# Patient Record
Sex: FEMALE | Race: WHITE | NOT HISPANIC OR LATINO | Employment: OTHER | URBAN - METROPOLITAN AREA
[De-identification: names, ages, dates, MRNs, and addresses within clinical notes are randomized per-mention and may not be internally consistent; named-entity substitution may affect disease eponyms.]

---

## 2022-10-26 ENCOUNTER — CONSULT (OUTPATIENT)
Dept: GASTROENTEROLOGY | Facility: CLINIC | Age: 72
End: 2022-10-26

## 2022-10-26 VITALS
DIASTOLIC BLOOD PRESSURE: 66 MMHG | HEIGHT: 58 IN | BODY MASS INDEX: 24.27 KG/M2 | TEMPERATURE: 98.1 F | SYSTOLIC BLOOD PRESSURE: 120 MMHG | WEIGHT: 115.6 LBS

## 2022-10-26 DIAGNOSIS — K21.9 GASTROESOPHAGEAL REFLUX DISEASE, UNSPECIFIED WHETHER ESOPHAGITIS PRESENT: Primary | ICD-10-CM

## 2022-10-26 DIAGNOSIS — K58.9 IRRITABLE BOWEL SYNDROME, UNSPECIFIED TYPE: ICD-10-CM

## 2022-10-26 DIAGNOSIS — R14.0 ABDOMINAL BLOATING: ICD-10-CM

## 2022-10-26 RX ORDER — ALUMINUM ZIRCONIUM OCTACHLOROHYDREX GLY 16 G/100G
GEL TOPICAL
COMMUNITY

## 2022-10-26 RX ORDER — LISINOPRIL AND HYDROCHLOROTHIAZIDE 12.5; 1 MG/1; MG/1
TABLET ORAL
COMMUNITY
Start: 2022-08-01

## 2022-10-26 RX ORDER — MONTELUKAST SODIUM 10 MG/1
TABLET ORAL
COMMUNITY
Start: 2022-08-01

## 2022-10-26 RX ORDER — ATORVASTATIN CALCIUM 40 MG/1
TABLET, FILM COATED ORAL
COMMUNITY
Start: 2022-10-18

## 2022-10-26 RX ORDER — DEXLANSOPRAZOLE 60 MG/1
CAPSULE, DELAYED RELEASE ORAL
COMMUNITY
Start: 2022-10-18

## 2022-10-26 RX ORDER — ESTRADIOL 10 UG/1
INSERT VAGINAL
COMMUNITY
Start: 2022-10-21

## 2022-10-26 RX ORDER — SERTRALINE HYDROCHLORIDE 100 MG/1
TABLET, FILM COATED ORAL
COMMUNITY
Start: 2022-10-18

## 2022-10-28 PROBLEM — R14.0 ABDOMINAL BLOATING: Status: ACTIVE | Noted: 2022-10-28

## 2022-10-28 PROBLEM — K58.9 IBS (IRRITABLE BOWEL SYNDROME): Status: ACTIVE | Noted: 2022-10-28

## 2022-10-28 PROBLEM — K21.9 GERD (GASTROESOPHAGEAL REFLUX DISEASE): Status: ACTIVE | Noted: 2022-10-28

## 2022-10-28 NOTE — ASSESSMENT & PLAN NOTE
Reported long-standing history of IBS  Symptoms appear to be relatively stable with exception of abdominal bloating which has worsened recently  Etiologies include SIBO vs dysbiosis vs other    · Trial low FODMAP diet  · Advised to try OTC Beano 3 times daily with meals  · Consider empiric treatment with rifaximin in the future if symptoms persist  · Follow-up in 4 weeks

## 2022-10-28 NOTE — PROGRESS NOTES
[facility] - Outpatient Consultation  Prakash Dai 66 y o  female MRN: [id number]  Encounter: [id number]      ASSESSMENT & PLAN:      Problem List Items Addressed This Visit        Digestive    GERD (gastroesophageal reflux disease) - Primary     Symptoms appear to be well-controlled on Dexilant 60 mg daily and also Pepcid 20 mg daily at bedtime  Reports having had EGD in the past at [facility] but report is not available for review  No alarm symptoms  · Continue Dexilant 60 mg daily  · Continue Pepcid 20 mg daily at bedtime  · I asked patient to obtain endoscopy records for review  · If symptoms persist and unable to obtain previous endoscopy records, can discuss possible repeat endoscopy         Relevant Medications    Dexilant 60 MG capsule    IBS (irritable bowel syndrome)     Reported long-standing history of IBS  Symptoms appear to be relatively stable with exception of abdominal bloating which has worsened recently  Etiologies include SIBO vs dysbiosis vs other  · Trial low FODMAP diet  · Advised to try OTC Beano 3 times daily with meals  · Consider empiric treatment with rifaximin in the future if symptoms persist  · Follow-up in 4 weeks            Other    Abdominal bloating     Reports significant abdominal bloating which is intermittent but chronic issue  Possible etiologies include food intolerance vs SIBO vs related to underlying IBS vs other  Has tried Gas-X with no relief  · Reviewed low FODMAP diet and advised patient to follow diet strictly for 3 weeks; if symptoms improve, reintroduce food items one at a time to identify triggers  · Advised patient to try OTC Beano which may be helpful; instructed to take 3 times daily with meals  · Follow-up in 4 weeks             No orders of the defined types were placed in this encounter        Follow-up: 4 weeks  ______________________________________________________________________    HPI:  65-year-old female presents to GI clinic with complaints of worsening abdominal bloating  Patient has history of GERD and IBS as well as other medical diagnoses of HTN, HLD, anxiety  Patient reports that over the last couple of weeks, has noticed more abdominal bloating and gassiness  She has tried OTC Gas-X with no significant relief  She does take Dexilant 60 mg daily as well as Pepcid 20 mg daily QHS for history of GERD  She reports having had EGD as well as colonoscopy in the past at [facility] ([facility])  These were several years ago and report is not available for review  She denies any significant alarm symptoms including dysphagia, odynophagia, nausea, vomiting, unintentional weight loss, change in bowel habits, hematochezia, or melena  She does admit to dietary indiscretion, eating certain foods which may be contributing to her symptoms  She reports her GERD symptoms are relatively well controlled on the Dexilant and Pepcid but does admit with the increased bloating that she has had some worsening reflux as well  She also states that some of her symptoms may be related to anxiety      Last endoscopy: Remote at [facility]SH  Last colonoscopy: Remote at [address]:    CONSTITUTIONAL: Denies any fever, chills, rigors, and weight loss  HEENT: No earache or tinnitus, denies hearing loss or visual disturbances  CARDIOVASCULAR: No chest pain or palpitations  RESPIRATORY: Denies any cough, hemoptysis, shortness of breath or dyspnea on exertion  GASTROINTESTINAL: As noted in the History of Present Illness  GENITOURINARY: No problems with urination, denies any hematuria or dysuria  NEUROLOGIC: No dizziness or vertigo, denies headaches   MUSCULOSKELETAL: Denies any muscle or joint pain   SKIN: Denies skin rashes or itching  ENDOCRINE: Denies excessive thirst, denies intolerance to heat or cold  PSYCHOSOCIAL: Denies depression, denies any recent memory loss     Historical Information   Past Medical History:   Diagnosis Date   • Colon polyp    • GERD (gastroesophageal reflux disease)    • Hyperlipidemia      Past Surgical History:   Procedure Laterality Date   • COLONOSCOPY     • HYSTERECTOMY       Social History   Social History     Substance and Sexual Activity   Alcohol Use Yes     Social History     Substance and Sexual Activity   Drug Use Never     Social History     Tobacco Use   Smoking Status Former Smoker   Smokeless Tobacco Not on file     Family History   Problem Relation Age of Onset   • Hyperlipidemia Mother        MEDICATIONS & ALLERGIES:    Current Outpatient Medications   Medication Instructions   • atorvastatin (LIPITOR) 40 mg tablet No dose, route, or frequency recorded  • bifidobacterium infantis (ALIGN) capsule Oral   • Dexilant 60 MG capsule No dose, route, or frequency recorded  • estradiol (VAGIFEM, YUVAFEM) 10 MCG TABS vaginal tablet No dose, route, or frequency recorded  • lisinopril-hydrochlorothiazide (PRINZIDE,ZESTORETIC) 10-12 5 MG per tablet No dose, route, or frequency recorded  • montelukast (SINGULAIR) 10 mg tablet No dose, route, or frequency recorded  • sertraline (ZOLOFT) 100 mg tablet No dose, route, or frequency recorded  No Known Allergies    PHYSICAL EXAM:      Objective   Blood pressure 120/66, temperature 98 1 °F (36 7 °C), temperature source Tympanic, height 4' 10" (1 473 m), weight 52 4 kg (115 lb 9 6 oz)  Body mass index is 24 16 kg/m²  General Appearance:   Alert, cooperative, no distress   HEENT:   Normocephalic, atraumatic, anicteric     Neck:   Supple, symmetrical, trachea midline   Lungs:   Equal chest rise, respirations unlabored    Heart:   Regular rate and rhythm   Abdomen:   Soft, non-tender, non-distended; normal bowel sounds; no masses, no organomegaly    Rectal:   Deferred    Extremities:   No cyanosis, clubbing or edema    Neuro: Moves all 4 extremities    Skin:   No jaundice, rashes, or lesions      LAB RESULTS:     No visits with results within 1 Day(s) from this visit     Latest known visit with results is:   No results found for any previous visit  RADIOLOGY RESULTS: I have personally reviewed pertinent imaging studies  DASIA Palacios  Chief Gastroenterology Fellow  [facility]  Division of Gastroenterology and Hepatology  Available on [email or web address]

## 2022-10-28 NOTE — ASSESSMENT & PLAN NOTE
Symptoms appear to be well-controlled on Dexilant 60 mg daily and also Pepcid 20 mg daily at bedtime  Reports having had EGD in the past at [facility]SH but report is not available for review  No alarm symptoms    · Continue Dexilant 60 mg daily  · Continue Pepcid 20 mg daily at bedtime  · I asked patient to obtain endoscopy records for review  · If symptoms persist and unable to obtain previous endoscopy records, can discuss possible repeat endoscopy

## 2022-10-28 NOTE — ASSESSMENT & PLAN NOTE
Reports significant abdominal bloating which is intermittent but chronic issue  Possible etiologies include food intolerance vs SIBO vs related to underlying IBS vs other  Has tried Gas-X with no relief    · Reviewed low FODMAP diet and advised patient to follow diet strictly for 3 weeks; if symptoms improve, reintroduce food items one at a time to identify triggers  · Advised patient to try OTC Beano which may be helpful; instructed to take 3 times daily with meals  · Follow-up in 4 weeks

## 2022-11-01 ENCOUNTER — TELEPHONE (OUTPATIENT)
Dept: OTHER | Facility: OTHER | Age: 72
End: 2022-11-01

## 2022-11-01 NOTE — TELEPHONE ENCOUNTER
Patient called in with questions regarding low FODMAP diet  Through research, patient has come across conflict with ingredients for recipes  Came across [place]  What is it and can she take it? Please follow up with patient

## 2022-11-01 NOTE — TELEPHONE ENCOUNTER
Discussed with pt that fodzyme is similar to the pre and pro biotic pt already purchased  Pt purchased align   I advised continuing with that

## 2022-12-07 ENCOUNTER — OFFICE VISIT (OUTPATIENT)
Dept: GASTROENTEROLOGY | Facility: CLINIC | Age: 72
End: 2022-12-07

## 2022-12-07 VITALS
DIASTOLIC BLOOD PRESSURE: 70 MMHG | TEMPERATURE: 97.5 F | BODY MASS INDEX: 23.3 KG/M2 | SYSTOLIC BLOOD PRESSURE: 110 MMHG | HEIGHT: 58 IN | WEIGHT: 111 LBS

## 2022-12-07 DIAGNOSIS — R14.0 ABDOMINAL BLOATING: ICD-10-CM

## 2022-12-07 DIAGNOSIS — K63.89 SMALL INTESTINAL BACTERIAL OVERGROWTH (SIBO): ICD-10-CM

## 2022-12-07 DIAGNOSIS — K58.9 IRRITABLE BOWEL SYNDROME, UNSPECIFIED TYPE: Primary | ICD-10-CM

## 2022-12-07 DIAGNOSIS — K21.9 GASTROESOPHAGEAL REFLUX DISEASE, UNSPECIFIED WHETHER ESOPHAGITIS PRESENT: ICD-10-CM

## 2022-12-07 DIAGNOSIS — R10.13 EPIGASTRIC ABDOMINAL PAIN: ICD-10-CM

## 2022-12-07 NOTE — ASSESSMENT & PLAN NOTE
Symptoms appear to be controlled at this time although does have some upper abdominal discomfort  Currently on Dexilant 60 mg daily and Pepcid 20 mg daily at bedtime  No alarm symptoms  Had EGD in 2018 at [facility] which was negative    · Continue Dexilant 60 mg daily and Pepcid 20 mg daily at bedtime  · Defer endoscopy for now although if upper abdominal discomfort persists, can consider repeat endoscopic evaluation at that time  · Follow-up in 3 months

## 2022-12-07 NOTE — ASSESSMENT & PLAN NOTE
Longstanding history of IBS  Symptoms appear to have improved compared to last office visit although continues to have some abdominal bloating and upper abdominal discomfort  Stools have become more formed described as lumpy  Following low FODMAP diet although this has affected patient's appetite and she has lost a small amount of weight from this  Symptoms may be due to underlying SIBO    · Will treat IBS, possible underlying SIBO with course of rifaximin 550 mg Q8h x14 days  · Advised to hold probiotic while on rifaximin; can resume probiotic once completed therapy  · Encouraged to continue following low FODMAP diet and to reintroduce food now one at a time to help identify triggers  · Follow-up in 3 months

## 2022-12-07 NOTE — PATIENT INSTRUCTIONS
WE WILL TREAT YOU FOR POSSIBLE SIBO WITH AN ANTIBIOTIC CALLED RIFAXIMIN (XIFAXAN)  TAKE THIS EVERY 8 HOURS FOR 2 WEEKS  DURING THIS TIME, YOU CAN HOLD THE ALIGN (PROBIOTIC)  YOU CAN RESUME THE ALIGN AFTER YOU FINISH THE ANTIBIOTIC  WE WILL ALSO CHECK A RIGHT UPPER QUADRANT ULTRASOUND TO CHECK YOUR GALLBLADDER FOR GALL STONES  CONTINUE THE LOW FODMAP DIET BUT YOU MAY REINTRODUCE FOOD TO IDENTIFY TRIGGERS

## 2022-12-07 NOTE — ASSESSMENT & PLAN NOTE
Etiology unclear  May be related to reflux versus possible IBS versus SIBO versus other functional disorder versus possible biliary disease versus other  No significant alarm symptoms at this time  Last endoscopy report reviewed which was done at [facility] in 2018  No significant endoscopic findings  Biopsies negative  Had colonoscopy at that time as well which showed some oozing cecal AVMs which were treated with APC  Also sigmoid diverticulosis  No other significant findings    · Check right upper quadrant ultrasound to rule out biliary disease  · Continue Dexilant 60 mg daily and Pepcid 20 mg daily at bedtime  · Continue low FODMAP diet for IBS as noted above  · Treat for possible underlying SIBO with course of rifaximin as noted above  · Follow-up in 3 months  · If symptoms persist despite after mentioned measures, consider repeat endoscopic evaluation

## 2022-12-07 NOTE — PROGRESS NOTES
[facility] - Outpatient Follow-up  Yas Porter 66 y o  female MRN: [id number]  Encounter: [id number]      ASSESSMENT & PLAN:      Problem List Items Addressed This Visit        Digestive    GERD (gastroesophageal reflux disease)     Symptoms appear to be controlled at this time although does have some upper abdominal discomfort  Currently on Dexilant 60 mg daily and Pepcid 20 mg daily at bedtime  No alarm symptoms  Had EGD in 2018 at [facility] which was negative  · Continue Dexilant 60 mg daily and Pepcid 20 mg daily at bedtime  · Defer endoscopy for now although if upper abdominal discomfort persists, can consider repeat endoscopic evaluation at that time  · Follow-up in 3 months         IBS (irritable bowel syndrome) - Primary     Longstanding history of IBS  Symptoms appear to have improved compared to last office visit although continues to have some abdominal bloating and upper abdominal discomfort  Stools have become more formed described as lumpy  Following low FODMAP diet although this has affected patient's appetite and she has lost a small amount of weight from this  Symptoms may be due to underlying SIBO  · Will treat IBS, possible underlying SIBO with course of rifaximin 550 mg Q8h x14 days  · Advised to hold probiotic while on rifaximin; can resume probiotic once completed therapy  · Encouraged to continue following low FODMAP diet and to reintroduce food now one at a time to help identify triggers  · Follow-up in 3 months         Relevant Medications    rifaximin (XIFAXAN) 550 mg tablet       Other    Abdominal bloating     Symptoms somewhat improved although does continue to have some mild upper abdominal discomfort as well as bloating  May be due to underlying SIBO    · Trial rifaximin for possible SIBO and continue low FODMAP diet as noted above         Relevant Medications    rifaximin (XIFAXAN) 550 mg tablet    Epigastric abdominal pain     Etiology unclear  May be related to reflux versus possible IBS versus SIBO versus other functional disorder versus possible biliary disease versus other  No significant alarm symptoms at this time  Last endoscopy report reviewed which was done at [facility] in 2018  No significant endoscopic findings  Biopsies negative  Had colonoscopy at that time as well which showed some oozing cecal AVMs which were treated with APC  Also sigmoid diverticulosis  No other significant findings  · Check right upper quadrant ultrasound to rule out biliary disease  · Continue Dexilant 60 mg daily and Pepcid 20 mg daily at bedtime  · Continue low FODMAP diet for IBS as noted above  · Treat for possible underlying SIBO with course of rifaximin as noted above  · Follow-up in 3 months  · If symptoms persist despite after mentioned measures, consider repeat endoscopic evaluation         Relevant Orders    US right upper quadrant   Other Visit Diagnoses     Small intestinal bacterial overgrowth (SIBO)        Relevant Medications    rifaximin (XIFAXAN) 550 mg tablet        Orders Placed This Encounter   Procedures   • US right upper quadrant     Standing Status:   Future     Standing Expiration Date:   12/7/2026     Scheduling Instructions:      NPO Adult Preps- patients over 15years of age             If your appointment is scheduled BEFORE 12:00pm, you may not eat or drink anything other than water, after midnight on the day of you test  Medications can be taken with water  Diabetics can have juice  If your appointment is AFTER 12:00pm, you may have a fat free breakfast before 8:00am and remain on clear liquids (no soda, you may have clear tea without dairy, juice or water) until your test is complete  If your appointment is scheduled AFTER 4:00pm, you may not have anything to eat after 12 noon on the day of the test  Water and juice is allowed up to the appointment time  Do not drink any carbonated beverages  Medications may be taken as needed with water  Diabetics who need PM appointments may have a light breakfast but cannot have any additional food or drinks after breakfast  Schedule PM appointments for a time 6-8 hours after that morning meal                   NPO Pediatric Preps birth to 15 years             Infants- Birth to 3 year of age  Hold the feeding closest to the appointment time but not more than 2 hours prior  Do not bottle or breast feed or give solid foods to the baby during that 2 hour period prior to the appointment  1 year to 12 years- low fat meal (no dairy) a minimum of 4 hours prior to the appointment  No carbonated beverages are allowed  Water, Juice and clear tea are allowed  Medications may be taken with water  ---------------------------------------------------------------------------------------------------------------------             ELASTOGRAPHY PATIENST ONLY: all preps are for an 6-8 hour period- MINIMUM- NO Medications allowed during that 8 hour period, sips of water or ice chips can be allowed  Please bring your insurance cards, a form of photo ID and a list of your medications with you  Arrive 15 minutes prior to your appointment time in order to register  To schedule this appointment, please contact Central Scheduling at [phone]  Follow-up: 3 months  ______________________________________________________________________    HPI: 67-year-old female presents to GI clinic for follow-up  Patient previously seen for complaints of abdominal bloating and IBS symptoms  Since last office visit, patient has been following low FODMAP diet strictly  She reports that her overall abdominal bloating appears to have improved slightly although she continues to have some mild symptoms  She also reports having some ongoing mild epigastric discomfort described primarily as burning    She is on Dexilant 60 mg daily and Pepcid 20 mg daily at bedtime  She reports that her stools have improved in appearance  She describes the stools as more lumpy in appearance and formed compared to previous  Denies any hematochezia or melena  Denies batool diarrhea  While following the low FODMAP diet, she has had a lower appetite  She has lost a small amount of weight approximately 5 pounds over the last month due to this although more recently she has begun to reintroduce food items that she tends to enjoy  As such, she has gained 1 pound over the last week  Denies any dysphagia or odynophagia, nausea, vomiting      Last endoscopy: 2018 at [facility] which was normal; biopsies negative  Last colonoscopy: 2018 with oozing cecal AVMs treated with APC, sigmoid diverticulosis    REVIEW OF SYSTEMS:    CONSTITUTIONAL: Denies any fever, chills, rigors, and weight loss  HEENT: No earache or tinnitus, denies hearing loss or visual disturbances  CARDIOVASCULAR: No chest pain or palpitations   RESPIRATORY: Denies any cough, hemoptysis, shortness of breath or dyspnea on exertion  GASTROINTESTINAL: As noted in the History of Present Illness   GENITOURINARY: No problems with urination, denies any hematuria or dysuria  NEUROLOGIC: No dizziness or vertigo  MUSCULOSKELETAL: Denies any muscle or joint pain   SKIN: Denies skin rashes or itching   ENDOCRINE: Denies excessive thirst, denies intolerance to heat or cold  PSYCHOSOCIAL: Denies depression, denies any recent memory loss     Answers for HPI/ROS submitted by the patient on 12/6/2022  Chronicity: recurrent  Onset: more than 1 year ago  Onset quality: gradual  Frequency: intermittently  Episode duration: 5 Days  Progression since onset: gradually worsening  Pain location: epigastric region  Pain - numeric: 4/10  Pain quality: burning  Radiates to: epigastric region  anorexia: Yes  arthralgias: Yes  belching: Yes  constipation: Yes  diarrhea: No  dysuria: No  fever: No  flatus: Yes  frequency: No  headaches: Yes  hematochezia: No  hematuria: No  melena: No  myalgias: Yes  nausea: Yes  weight loss: Yes  vomiting: No  Diagnostic workup: CT scan, GI consult    Historical Information   Past Medical History:   Diagnosis Date   • Colon polyp    • GERD (gastroesophageal reflux disease)    • Hyperlipidemia      Past Surgical History:   Procedure Laterality Date   • COLONOSCOPY     • HYSTERECTOMY       Social History   Social History     Substance and Sexual Activity   Alcohol Use Yes     Social History     Substance and Sexual Activity   Drug Use Never     Social History     Tobacco Use   Smoking Status Former   Smokeless Tobacco Not on file     Family History   Problem Relation Age of Onset   • Hyperlipidemia Mother        MEDICATIONS & ALLERGIES:    Current Outpatient Medications   Medication Instructions   • atorvastatin (LIPITOR) 40 mg tablet No dose, route, or frequency recorded  • bifidobacterium infantis (ALIGN) capsule Oral   • Dexilant 60 MG capsule No dose, route, or frequency recorded  • estradiol (VAGIFEM, YUVAFEM) 10 MCG TABS vaginal tablet No dose, route, or frequency recorded  • lisinopril-hydrochlorothiazide (PRINZIDE,ZESTORETIC) 10-12 5 MG per tablet No dose, route, or frequency recorded  • montelukast (SINGULAIR) 10 mg tablet No dose, route, or frequency recorded  • rifaximin (XIFAXAN) 550 mg, Oral, Every 8 hours scheduled   • sertraline (ZOLOFT) 100 mg tablet No dose, route, or frequency recorded  No Known Allergies    PHYSICAL EXAM:      Objective   Blood pressure 110/70, temperature 97 5 °F (36 4 °C), temperature source Tympanic, height 4' 10" (1 473 m), weight 50 3 kg (111 lb)  Body mass index is 23 2 kg/m²      General Appearance:   Alert, cooperative, no distress   HEENT:   Normocephalic, atraumatic, anicteric     Neck:   Supple, symmetrical, trachea midline   Lungs:   Equal chest rise, respirations unlabored    Heart:   Regular rate and rhythm   Abdomen:   Soft, non-tender, non-distended; normal bowel sounds; no masses, no organomegaly    Rectal:   Deferred    Extremities:   No cyanosis, clubbing or edema    Neuro: Moves all 4 extremities    Skin:   No jaundice, rashes, or lesions      LAB RESULTS:     No visits with results within 1 Day(s) from this visit  Latest known visit with results is:   No results found for any previous visit  RADIOLOGY RESULTS: I have personally reviewed pertinent imaging studies  DASIA Jhaveri  Chief Gastroenterology Fellow  [address]  Division of Gastroenterology and Hepatology  Available on [email or web address]

## 2022-12-07 NOTE — ASSESSMENT & PLAN NOTE
Symptoms somewhat improved although does continue to have some mild upper abdominal discomfort as well as bloating  May be due to underlying SIBO    · Trial rifaximin for possible SIBO and continue low FODMAP diet as noted above

## 2022-12-12 ENCOUNTER — TELEPHONE (OUTPATIENT)
Dept: GASTROENTEROLOGY | Facility: CLINIC | Age: 72
End: 2022-12-12

## 2022-12-12 NOTE — TELEPHONE ENCOUNTER
----- Message from Osvaldo Torres DO sent at 12/7/2022 11:27 AM EST -----  Regarding: Rifaximin prior auth  Hello    I am starting this patient on rifaximin for IBS, possible SIBO  She may need prior auth  Thank you  DASIA Khanna  Chief Gastroenterology Fellow  [address]  Division of Gastroenterology and Hepatology  Available on [email or web address]

## 2022-12-13 NOTE — TELEPHONE ENCOUNTER
Mariah Clifford ([place]: [id number]) - [id number]  Xifaxan 550MG tablets  Status: PA Response - Approved  Created: December 7th, 1883 [phone]  Sent: December 13th, 2022        I called pts pharmacy verified Approval , they open at [address] [place] [address] Avenue

## 2022-12-13 NOTE — TELEPHONE ENCOUNTER
I called [employer] , they will order medication and have available tomorrow afternoon     Co-pay 500 00    Patient has secondary insurance ([employer]) that is also requiring an Aniya Nelson    [phone]

## 2022-12-14 ENCOUNTER — TELEPHONE (OUTPATIENT)
Dept: GASTROENTEROLOGY | Facility: CLINIC | Age: 72
End: 2022-12-14

## 2022-12-14 ENCOUNTER — TELEPHONE (OUTPATIENT)
Dept: OTHER | Facility: OTHER | Age: 72
End: 2022-12-14

## 2022-12-14 NOTE — TELEPHONE ENCOUNTER
[facility] is requesting that the order for US right upper quadrant ordered by Dr Angélica Arzate be faxed to them in order to schedule    [facility]  Fax  [phone]

## 2022-12-14 NOTE — TELEPHONE ENCOUNTER
Spoke with the patient to let her know that I was able to fax over the order of her [address] ordered by Dr Soto to PACS with fax # provided  I received confirmation and scanned into the patient's chart

## 2022-12-15 NOTE — TELEPHONE ENCOUNTER
I called [employer] program spoke with Giovanna Lock    She will fax over form for 2nd auth to our [place] office     I called and spoke with office to please e-mail me when form arrives